# Patient Record
Sex: MALE
[De-identification: names, ages, dates, MRNs, and addresses within clinical notes are randomized per-mention and may not be internally consistent; named-entity substitution may affect disease eponyms.]

---

## 2020-03-26 ENCOUNTER — TRANSCRIPTION ENCOUNTER (OUTPATIENT)
Age: 65
End: 2020-03-26

## 2022-10-24 PROBLEM — Z00.00 ENCOUNTER FOR PREVENTIVE HEALTH EXAMINATION: Status: ACTIVE | Noted: 2022-10-24

## 2022-11-08 ENCOUNTER — NON-APPOINTMENT (OUTPATIENT)
Age: 67
End: 2022-11-08

## 2022-11-08 ENCOUNTER — APPOINTMENT (OUTPATIENT)
Dept: HEART AND VASCULAR | Facility: CLINIC | Age: 67
End: 2022-11-08

## 2022-11-08 VITALS — DIASTOLIC BLOOD PRESSURE: 85 MMHG | SYSTOLIC BLOOD PRESSURE: 144 MMHG

## 2022-11-08 VITALS
BODY MASS INDEX: 21.76 KG/M2 | DIASTOLIC BLOOD PRESSURE: 75 MMHG | HEIGHT: 70 IN | HEART RATE: 75 BPM | OXYGEN SATURATION: 98 % | TEMPERATURE: 97.8 F | WEIGHT: 152 LBS | SYSTOLIC BLOOD PRESSURE: 141 MMHG

## 2022-11-08 VITALS — SYSTOLIC BLOOD PRESSURE: 130 MMHG | DIASTOLIC BLOOD PRESSURE: 80 MMHG

## 2022-11-08 VITALS — DIASTOLIC BLOOD PRESSURE: 89 MMHG | SYSTOLIC BLOOD PRESSURE: 149 MMHG

## 2022-11-08 VITALS — SYSTOLIC BLOOD PRESSURE: 130 MMHG | DIASTOLIC BLOOD PRESSURE: 77 MMHG

## 2022-11-08 DIAGNOSIS — R55 SYNCOPE AND COLLAPSE: ICD-10-CM

## 2022-11-08 DIAGNOSIS — Z78.9 OTHER SPECIFIED HEALTH STATUS: ICD-10-CM

## 2022-11-08 DIAGNOSIS — I47.1 SUPRAVENTRICULAR TACHYCARDIA: ICD-10-CM

## 2022-11-08 DIAGNOSIS — R42 DIZZINESS AND GIDDINESS: ICD-10-CM

## 2022-11-08 DIAGNOSIS — Z82.3 FAMILY HISTORY OF STROKE: ICD-10-CM

## 2022-11-08 PROCEDURE — 93000 ELECTROCARDIOGRAM COMPLETE: CPT

## 2022-11-08 PROCEDURE — 99205 OFFICE O/P NEW HI 60 MIN: CPT

## 2022-11-08 PROCEDURE — ZZZZZ: CPT

## 2022-11-08 PROCEDURE — 93880 EXTRACRANIAL BILAT STUDY: CPT

## 2022-11-08 RX ORDER — DICYCLOMINE HYDROCHLORIDE 10 MG/1
10 CAPSULE ORAL
Qty: 60 | Refills: 0 | Status: ACTIVE | COMMUNITY
Start: 2022-08-12

## 2022-11-08 RX ORDER — METOPROLOL SUCCINATE 25 MG/1
25 TABLET, EXTENDED RELEASE ORAL
Refills: 0 | Status: ACTIVE | COMMUNITY
Start: 2022-10-13

## 2022-11-08 RX ORDER — LORAZEPAM 1 MG/1
1 TABLET ORAL
Qty: 4 | Refills: 0 | Status: COMPLETED | COMMUNITY
Start: 2022-08-08

## 2022-11-08 RX ORDER — ATORVASTATIN CALCIUM 10 MG/1
10 TABLET, FILM COATED ORAL
Qty: 45 | Refills: 0 | Status: ACTIVE | COMMUNITY
Start: 2022-10-13

## 2022-11-08 RX ORDER — FLUCONAZOLE 200 MG/1
200 TABLET ORAL
Qty: 24 | Refills: 0 | Status: COMPLETED | COMMUNITY
Start: 2022-08-29

## 2022-11-08 RX ORDER — ECONAZOLE NITRATE 10 MG/G
1 CREAM TOPICAL
Qty: 85 | Refills: 0 | Status: COMPLETED | COMMUNITY
Start: 2022-08-29

## 2022-11-09 PROBLEM — R42 DIZZINESS: Status: ACTIVE | Noted: 2022-11-08

## 2022-11-09 PROBLEM — I47.1 SUPRAVENTRICULAR TACHYCARDIA: Status: ACTIVE | Noted: 2022-11-09

## 2022-11-09 PROBLEM — R55 SYNCOPE AND COLLAPSE: Status: ACTIVE | Noted: 2022-11-09

## 2022-11-10 NOTE — REVIEW OF SYSTEMS
[Palpitations] : palpitations [Syncope] : syncope [Dizziness] : dizziness [Fever] : no fever [Chills] : no chills [SOB] : no shortness of breath [Dyspnea on exertion] : not dyspnea during exertion [Chest Discomfort] : no chest discomfort [Lower Ext Edema] : no extremity edema [Leg Claudication] : no intermittent leg claudication [Abdominal Pain] : no abdominal pain [Nausea] : no nausea [Vomiting] : no vomiting [Heartburn] : no heartburn [Diarrhea] : diarrhea [Blood in stool] : no blood in stoo [Numbness (Hypoesthesia)] : no numbness [Weakness] : no weakness [Speech Disturbance] : no speech disturbance [Easy Bleeding] : no tendency for easy bleeding

## 2022-11-10 NOTE — DISCUSSION/SUMMARY
[FreeTextEntry1] : 67 year old male, non smoker with PMHX of IBS and no significant early FMHX of CAD / CVA here to establish cardiac care for episodes of syncope. \par \par SYNCOPE / PALPITATIONS: EKG today SR 70bpm, R' in V1 - V2 similar to history. Carotid Ultrasound done today to assess for stenosis revealed no significant stenosis. \par \par I, Dr. Clemencia Leone personally performed the evaluation and management services for this new patient who presents today with a new problem.  The evaluation and management includes conducting the examination assessing all conditions and establishing a new plan of care.  Today my ACP Edwige Ruiz was here and recorded the evaluation and management services. \par \par Patient has had at least 2 episodes of SVT with a rate up to 200.  This together with episodes of GI upset probably explain syncope.  Recommend increase metoprolol and take 50 mg twice a day.  If he has a recurrent episode on this regimen recommend loop recorder.  Patient's questions were answered.\par  [EKG obtained to assist in diagnosis and management of assessed problem(s)] : EKG obtained to assist in diagnosis and management of assessed problem(s)

## 2022-11-10 NOTE — HISTORY OF PRESENT ILLNESS
[FreeTextEntry1] : 67 year old male, non smoker with PMHX of IBS and no significant early FMHX of CAD / CVA here to establish cardiac care for episodes of syncope. \par \par Patient has a history of 5 episodes of syncope  as an adult with 3 in the past 24 months. He did report one episode as a kid.  Last episode 3 weeks ago. Patient was sitting down when he suddenly felt light headed and lost conscious. He denies any preceding palpitations, shortness of breath or chest pain. He did get checked at [facility] and released in a few hours. Of note, he reported having the flu shot the day before. The previous episode of syncope was also during rest however anxious when looking at what abnormal IgM meant. The 3 episodes preceding this was associated with mild episodes of diarrhea. \par \par Patient was treated for an episode of SVT via EMS with Amiodarone. He was started on Metoprolol ER 25 and later increased to 50mg. He wore a 1 month monitor and revealed no significant findings\par \par Patient denies any recent episodes of rapid or irregular heart beats but is aware of his heart rate at night for the past 2 years. Symptoms occur about 3 times a week. Occasionally he notices a brief sensation of dizziness. HE denies any associated shortness of breath at rest, edema or neuro focal deficits. \par \par Patient cycles occasionally. He denies any chest pains or dyspnea on exertion. He averages 1 cup of coffee a day, sleeps average 5 hours and reports above average stress. \par \par Records Reviewed\par LABS (10/2022)\par Total Chol 184, HDL 59, , A1c 5.6, TSH wnl 1.76\par ECHO w Definity (5/2022) EF 60%, mild - mod MR, " buckling" noted at MV with no MVP\par \par \par  Topical Sulfur Applications Counseling: Topical Sulfur Counseling: Patient counseled that this medication may cause skin irritation or allergic reactions.  In the event of skin irritation, the patient was advised to reduce the amount of the drug applied or use it less frequently.   The patient verbalized understanding of the proper use and possible adverse effects of topical sulfur application.  All of the patient's questions and concerns were addressed.

## 2022-11-10 NOTE — PHYSICAL EXAM
[Well Developed] : well developed [Well Nourished] : well nourished [No Carotid Bruit] : no carotid bruit [Normal S1, S2] : normal S1, S2 [Clear Lung Fields] : clear lung fields [Good Air Entry] : good air entry [No Respiratory Distress] : no respiratory distress  [Normal Gait] : normal gait [No Edema] : no edema [Moves all extremities] : moves all extremities [No Focal Deficits] : no focal deficits [Normal Speech] : normal speech [Alert and Oriented] : alert and oriented [Normal memory] : normal memory [de-identified] : 2/6 systolic murmur heard best at RUSB